# Patient Record
(demographics unavailable — no encounter records)

---

## 2024-11-12 NOTE — HISTORY OF PRESENT ILLNESS
[FreeTextEntry1] : Zora is a 58 year old nonverbal female with PMH of profound intellectual disability, seizure disorder(unknown etiology), bipolar disorder and tomy presented for a follow up. [facility]. She was scheduled to have REEG this morning but they were late so it did  not happen.  Since last visit no reported seizures or hospitalizations. Zora refused to be examined today again so it is unlikely that we will be able to get REEG done. No new blood work done.   Pts last GTC event was October 2020  Pt is on Lamictal 250 mgs BID.  Pt had a VEEG in 2019 which was abnormal due to the presence of focal and generalized slowing   No record of MRI.  Unable to do any tests unless pt is sedated.

## 2024-11-12 NOTE — PHYSICAL EXAM
[FreeTextEntry1] : Physical exam was limited due to intellectual disability. \par  Pt is awake, nonverbal WRIGHT.  Attempts to track with eyes at times. Scoliotic posture noted.

## 2024-11-12 NOTE — HISTORY OF PRESENT ILLNESS
[FreeTextEntry1] : Zora is a 58 year old nonverbal female with PMH of profound intellectual disability, seizure disorder(unknown etiology), bipolar disorder and tomy presented for a follow up. [facility]. She was scheduled to have REEG this morning but they were late so it did  not happen.  Since last visit no reported seizures or hospitalizations. Zora refused to be examined today again so it is unlikely that we will be able to get REEG done. No new blood work done.   Pts last GTC event was October 2020  Pt is on Lamictal 250 mgs BID.  Pt had a VEEG in 2019 which was abnormal due to the presence of focal and generalized slowing   No record of MRI.  Unable to do any tests unless pt is sedated.

## 2024-11-12 NOTE — DISCUSSION/SUMMARY
[FreeTextEntry1] : Zora is a 58 year old nonverbal female has a history of profound intellectual disability, seizure disorder(unknown etiology), bipolar disorder and tomy with good seizure control.    Plan: - will attempt REEG before next visit - follow up in 7-8 months - rx given for blood work to be done this month - maintain seizure diary, call if any issues arise prior to follow up - continue same Lamictal dose  Case discussed with Dr. Leela Hinton, DNP, ACNP-BC

## 2024-11-12 NOTE — HISTORY OF PRESENT ILLNESS
[FreeTextEntry1] : Zora is a 58 year old nonverbal female with PMH of profound intellectual disability, seizure disorder(unknown etiology), bipolar disorder and tomy presented for a follow up. [facility]. She was scheduled to have REEG this morning but they were late so it did  not happen.  Since last visit no reported seizures or hospitalizations. Zora refused to be examined today again so it is unlikely that we will be able to get REEG done. No new blood work done.   Pts last GTC event was October 2020  Pt is on Lamictal 250 mgs BID.  Pt had a VEEG in 2019 which was abnormal due to the presence of focal and generalized slowing   No record of MRI.  Unable to do any tests unless pt is sedated.

## 2024-11-12 NOTE — DISCUSSION/SUMMARY
[FreeTextEntry1] : Zora is a 58 year old nonverbal female has a history of profound intellectual disability, seizure disorder(unknown etiology), bipolar disorder and tmoy with good seizure control.    Plan: - will attempt REEG before next visit - follow up in 7-8 months - rx given for blood work to be done this month - maintain seizure diary, call if any issues arise prior to follow up - continue same Lamictal dose  Case discussed with Dr. Leela Hinton, DNP, ACNP-BC

## 2024-12-12 NOTE — ASSESSMENT
[FreeTextEntry1] : ?seronegative RA: Pt was noted to have significantly elevated CRP to 63 on recent labs, also ESR 35, with negative RF, CCP. Pt's exam is unclear. She does have abnormalities but it is unclear if they could be related to injuries as opposed to inflammatory arthritis. Pt was started on methotrexate in February 2023, no noted change in her behavior since that time. - Discontinue methotrexate - Discontinue folic acid - Start leflunomide 100 milligrams daily for 3 days, then 20 milligrams daily starting day 4 - Continue Tylenol 1000 mg q day prn pain - 11/2024 CBC normal, CMP with stable slightly elevated alk phos - f/u repeat labs in 1 month for side effects of leflunomide  f/u in 3 months

## 2024-12-12 NOTE — HISTORY OF PRESENT ILLNESS
[FreeTextEntry1] : Pt's hand deformities were noted to be progressing by her group home. She has not had x-rays done due to logistical difficulties.  Previous HPI: Pt has a h/o intellectual disability with stereotypic movement disorder and self-injurious behavior. She was initially seen by Dr. Petty once in 2/2023 for swelling and deformities in her hands, and was thought to have RA based on her hand exam. She was started on methotrexate 15 mg q week and never followed up since then. Per pt's caregiver, who has been working with her for approx 1 year, pt is agitated approx once per week, possibly due to pain? She has no LE problems or difficulty walking. No rashes.  Physical exam: Alert woman standing in exam room SKIN: no rashes MSK: Shoulders: Limited exam due to pt cooperation Elbows: + Bony nodule noted on L lateral elbow, limited exam due to pt cooperation Wrists: Full ROM b/l, no effusions Hands: + Bony hypertrophy in b/l MCPs, + ulnar deviation most pronounced in b/l 5th fingers, dorsal muscle wasting, + R 5th finger severe ulnar deviation, overall appear ?worse compared to prior Hips: No noted abnormalities Knees: limited exam, no clear abnormalities Ankles: no effusions, no clear abnormalities Feet: limited exam

## 2024-12-18 NOTE — PHYSICAL EXAM
[No Acute Distress] : no acute distress [Well Nourished] : well nourished [Normal Sclera/Conjunctiva] : normal sclera/conjunctiva [Normal Outer Ear/Nose] : the outer ears and nose were normal in appearance [Supple] : supple [No Respiratory Distress] : no respiratory distress  [No Accessory Muscle Use] : no accessory muscle use [Clear to Auscultation] : lungs were clear to auscultation bilaterally [Normal Rate] : normal rate  [Regular Rhythm] : with a regular rhythm [Normal S1, S2] : normal S1 and S2 [No Murmur] : no murmur heard [No Edema] : there was no peripheral edema [Soft] : abdomen soft [Non Tender] : non-tender [Non-distended] : non-distended [No HSM] : no HSM

## 2024-12-18 NOTE — END OF VISIT
[] : Resident [FreeTextEntry3] : Pt. here for annual physical exam.  Had Tdap 2/29/24, flu vaccine given today.  Blood work 11/6/24 done by Dr. Rasmussen, , 25-OH Vit. D 74, A1C 5.1 (9/6/24), TSH 1.92 (9/30/24), LDL 11, triglyceride 228 (9/30/24).  Blood work and EKG ordered.  Mammogram attempted, and pt. was uncooperative with exam; and seen Gyn 3/19/24, pt. uncooperative with exam.  Had colonoscopy 12/19/22 by Dr. Heck, repeat 5 years (12/2027).  Seen rheumatology 12/12/24, off methotrexate and folic acid; on leflunomide.  Will d/c Vitamin D [id number] weekly given 25-OH Vit. D 74.  Medication renewed.  Ophthal. referral for routine eye exam.  Follow Gyn 3/2025 for annual Gyn exam, rheumatology 3/18/25 for seronegative RA, neuro 6/4/25 for seizure.  RTC 3 months.

## 2024-12-18 NOTE — HISTORY OF PRESENT ILLNESS
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 Patient is non verbal. As per staff no new complaints, patient here for annual physical exam

## 2024-12-18 NOTE — ASSESSMENT
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   #Intellectual Disability (profound) - patient is nonverbal at baseline,  - no lab work as patient noncompliant so gets it done at the facility, reviewed on HIE (from sept and nov)  #h/o multiple colonic polyps s/p polypectomy #Cancer Colon Screening - 5mm polyp in transverse colon s/p polypectomy - 10mm polyp in cecum s/p polypectomy - Repeat colonoscopy done Dec 2022: hemorrhoids, diverticulosis, repeat in 5 years  #Rheumatoid Arthritis  - endorsed to aid at bedside, to obtain 3-6 monthly LFTs, CXR, monitor for side effects like oral ulcers and immunosuppression and follow up with rheum - Seen by Rheum in December 2024: Discontinue methotrexate and folic acid, Start leflunomide 100 milligrams daily for 3 days, then 20 milligrams daily starting day 4, c/w tylenol, f/u with labs and xrays, f/u in 3 months   #HLD - labs from nov 2024 - Lipid panel: Trig 228, , HDL 32, T chol 189 - currently on Atorvastatin 10mg daily - endorsed on low-calorie, low-fat diet, no soda/smoking./alcohol, and aerobic exercise as tolerated - Repeat lipid profile for next visit   #Bipolar II Disorder #Stereotypic movement disorder #Insomnia - Better on melatonin - c/w Lamotrigine 250mg BID  #h/o Seizure Disorder - last episode was in 2019 - c/w Lamotrigine 250mg BID - followed with neuro in nov 2024:  unable to complete REEG, will get it before next visit   #Allergic Rhinitis - takes Singular 10mg at bedtime  #h/o Choking-->resolved - currently on soft/bite sized diet (food is usually grounded up to prevent choking) - no recent episodes of choking  #Constipation - On senna and lactulose - Well controlled  #Vit D deficiency - Vit D 74 (sept 2024)  - no more supplements needed   #HCM - HnG3z=0.1, ophtho referral  - EKG ordered, unable to obtain due to noncompliance  - flu shot dec 2024 - Repeat colonoscopy done Dec 2022: hemorrhoids, diverticulosis, repeat in 5 years - OBGYN seen Mar 2024, breast exam done but was not able to do pap smear - Mammogram/Breast US not done- patient non cooperative - neuro f/u 4 June 2025 - rheum f/u 14 march 2025 - medications renewed - RTC in 3 months

## 2025-03-18 NOTE — HISTORY OF PRESENT ILLNESS
[FreeTextEntry1] : Pt's symptoms have reportedly been stable since her last visit. Per her caregiver, she intermittently expresses signs of discomfort by gesturing, and she receives prn Tylenol.  Previous HPI: Pt has a h/o intellectual disability with stereotypic movement disorder and self-injurious behavior. She was initially seen by Dr. Petty once in 2/2023 for swelling and deformities in her hands, and was thought to have RA based on her hand exam. She was started on methotrexate 15 mg q week and never followed up since then. Per pt's caregiver, who has been working with her for approx 1 year, pt is agitated approx once per week, possibly due to pain? She has no LE problems or difficulty walking. No rashes.  Physical exam: Alert woman standing in exam room SKIN: no rashes MSK: Shoulders: Limited exam due to pt cooperation Elbows: + Bony nodule noted on L lateral elbow, limited exam due to pt cooperation Wrists: Full ROM b/l, no effusions Hands: + Bony hypertrophy in b/l MCPs, + ulnar deviation most pronounced in b/l 5th fingers, dorsal muscle wasting, + R 5th finger severe ulnar deviation Hips: No noted abnormalities Knees: limited exam, no clear abnormalities Ankles: no effusions, no clear abnormalities Feet: limited exam

## 2025-03-18 NOTE — ASSESSMENT
[FreeTextEntry1] : ?seronegative RA: Pt was noted to have significantly elevated CRP to 63 on recent labs, also ESR 35, with negative RF, CCP. Pt's exam is unclear. She does have abnormalities but it is unclear if they could be related to injuries as opposed to inflammatory arthritis. Pt was started on methotrexate in February 2023, no noted change in her behavior since that time. - Okay to give Tylenol 325 mg every 6 hours as needed for pain - Continue leflunomide 20 mg q day - Pt had normal CBC and CMP with stable slightly elevated alk phos in 3/2025  f/u in 4 months, f/u labs prior to next visit

## 2025-03-19 NOTE — HISTORY OF PRESENT ILLNESS
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 As per staff no new complaints. Adviced to book appointment for Gyn.

## 2025-03-19 NOTE — ASSESSMENT
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), Insomnia (related to mood disorder), HLD, h/o colonic polyps, allergic rhinitis, constipation and a h/o choking presents to the clinic w/ staff Geovani   #Intellectual Disability (profound) - patient is nonverbal at baseline, - no lab work as patient noncompliant so gets it done at the facility, reviewed on HIE (from sept and nov)  #h/o multiple colonic polyps s/p polypectomy #Cancer Colon Screening - 5mm polyp in transverse colon s/p polypectomy - 10mm polyp in cecum s/p polypectomy - Repeat colonoscopy done Dec 2022: hemorrhoids, diverticulosis, repeat in 5 years  #Rheumatoid Arthritis - high esr/crp, negative ccp, RA  - Seen by Rheum 2025: leflunomide 20 qdaily, tylenol 325gm q6hr   #HLD - labs from nov 2024 - Lipid panel: Trig 228, , HDL 32, T chol 189 - currently on Atorvastatin 10mg daily - endorsed on low-calorie, low-fat diet, no soda/smoking./alcohol, and aerobic exercise as tolerated - Repeat lipid profile for next visit  #Bipolar II Disorder #Stereotypic movement disorder #Insomnia - Better on melatonin - c/w Lamotrigine 250mg BID  #h/o Seizure Disorder - last episode was in 2019 - c/w Lamotrigine 250mg BID - followed with neuro   #Allergic Rhinitis - takes Singular 10mg at bedtime  #h/o Choking-->resolved - currently on soft/bite sized diet (food is usually grounded up to prevent choking) - no recent episodes of choking  #Constipation - On senna and lactulose - Well controlled  #Vit D deficiency - Vit D 74 (sept 2024) - no more supplements needed  #HCM - HfW9d=9.1, ophtho f/u in may  - flu shot dec 2024 - Repeat colonoscopy done Dec 2022: hemorrhoids, diverticulosis, repeat in 2027 years - OBGYN seen Mar 2024, breast exam done but was not able to do pap smear - Mammogram/Breast US not done- patient non cooperative - neuro f/u 4 June 2025 - medications renewed - RTC in 3 months.

## 2025-03-19 NOTE — END OF VISIT
[] : Resident [FreeTextEntry3] : Pt. here for follow up.  Had Tdap 2/29/24, flu vaccine 12/18/24.  Blood work 3/11/25 by Dr. Rasmussen A1C 4.7, TSH 1.32, FT4 1.2, LDL 85, triglyceride 429, 25-OH Vit. D 48.  Mammogram attempted, and pt. was uncooperative with exam; and seen Gyn 3/19/24, pt. uncooperative with exam.  Had colonoscopy 12/19/22 by Dr. Heck, repeat 5 years (12/2027).  Medication renewed.  Advised staff to schedule Gyn appointment for annual Gyn exam.  Follow ophthal. 5/1/25, neuro 6/4/25 for seizure, rheumatology 7/16/25 for seronegative RA.  RTC 3 months

## 2025-03-19 NOTE — PHYSICAL EXAM
[No Acute Distress] : no acute distress [Well Nourished] : well nourished [Normal Sclera/Conjunctiva] : normal sclera/conjunctiva [PERRL] : pupils equal round and reactive to light [EOMI] : extraocular movements intact [Normal Outer Ear/Nose] : the outer ears and nose were normal in appearance [Normal Oropharynx] : the oropharynx was normal [No JVD] : no jugular venous distention [No Lymphadenopathy] : no lymphadenopathy [Supple] : supple [Thyroid Normal, No Nodules] : the thyroid was normal and there were no nodules present [No Respiratory Distress] : no respiratory distress  [No Accessory Muscle Use] : no accessory muscle use [Clear to Auscultation] : lungs were clear to auscultation bilaterally [Normal Rate] : normal rate  [Regular Rhythm] : with a regular rhythm [Normal S1, S2] : normal S1 and S2 [No Murmur] : no murmur heard [No Carotid Bruits] : no carotid bruits [No Abdominal Bruit] : a ~M bruit was not heard ~T in the abdomen [No Varicosities] : no varicosities [Pedal Pulses Present] : the pedal pulses are present [No Edema] : there was no peripheral edema [No Palpable Aorta] : no palpable aorta [No Extremity Clubbing/Cyanosis] : no extremity clubbing/cyanosis [Soft] : abdomen soft [Non Tender] : non-tender [Non-distended] : non-distended [No Masses] : no abdominal mass palpated [No HSM] : no HSM [Normal Bowel Sounds] : normal bowel sounds [Normal Posterior Cervical Nodes] : no posterior cervical lymphadenopathy [Normal Anterior Cervical Nodes] : no anterior cervical lymphadenopathy [No CVA Tenderness] : no CVA  tenderness [No Spinal Tenderness] : no spinal tenderness [No Joint Swelling] : no joint swelling [No Rash] : no rash

## 2025-06-10 NOTE — HISTORY OF PRESENT ILLNESS
[FreeTextEntry1] : Zora is a 59 year old nonverbal female with PMH of profound intellectual disability, seizure disorder(unknown etiology), bipolar disorder and tomy presented for a follow up. [facility].  We attempted to do REEG this morning again and were unsuccessful.  Since last visit no reported seizures or hospitalizations. Zora refused to be examined today again today. Does not want to be touched and pushed me away. Blood work from 3/11/25 Mag 2.0, Vit D 48, Lamictal  6 Pt is on Lamictal 250 mgs BID. Pts last GTC event was October 2020   Pt had a VEEG in 2019 which was abnormal due to the presence of focal and generalized slowing   No record of MRI.  Unable to do any tests unless pt is sedated.

## 2025-06-10 NOTE — HISTORY OF PRESENT ILLNESS
[FreeTextEntry1] : Zora is a 59 year old nonverbal female with PMH of profound intellectual disability, seizure disorder(unknown etiology), bipolar disorder and tomy presented for a follow up. [facility]  We attempted to do REEG this morning again and were unsuccessful.  Since last visit no reported seizures or hospitalizations. Zora refused to be examined today again today. Does not want to be touched and pushed me away. Blood work from 3/11/25 Mag 2.0, Vit D 48, Lamictal  6 Pt is on Lamictal 250 mgs BID. Pts last GTC event was October 2020   Pt had a VEEG in 2019 which was abnormal due to the presence of focal and generalized slowing   No record of MRI.  Unable to do any tests unless pt is sedated.

## 2025-06-10 NOTE — DISCUSSION/SUMMARY
[Safety Recommendations] : The patient was advised in regards to the risk of seizures and general seizure safety recommendations including not to be bathing alone, climbing to high places and operating heavy machinery. [Compliance with Medications] : The importance of compliance with medications was reinforced. [Medication Side Effects] : High frequency and serious potential medication adverse effects were reviewed with the patient, including but not exclusive to psychiatric effects.  Information sheets on medication side effects were made available to the patient in our clinic.  The patient or advocate agrees to notify us for any concerns. [Sleep Hygiene/Sleep Disruption Risks] : Sleep hygiene and the risks of sleep disruption were discussed. [FreeTextEntry1] : Zora is a 59 year old nonverbal female has a history of profound intellectual disability, seizure disorder(unknown etiology), bipolar disorder and tomy with good seizure control.    Plan: - follow up in 7-8 months - rx given for blood work to be done before next follow up - maintain seizure diary, call if any issues arise prior to follow up - continue same Lamictal dose  Case discussed with Dr. Leela Hinton, DNP, ACNP-BC

## 2025-07-16 NOTE — ASSESSMENT
[FreeTextEntry1] : ?seronegative RA: Pt was noted to have significantly elevated CRP to 63 on recent labs, also ESR 35, with negative RF, CCP. Pt's exam is unclear. She does have abnormalities but it is unclear if they could be related to injuries as opposed to inflammatory arthritis. Pt was started on methotrexate in February 2023, no noted change in her behavior since that time. - Continue Tylenol 325 mg every 6 hours as needed for pain - Continue leflunomide 20 mg q day - Pt had normal CBC and CMP with stable slightly elevated alk phos in 7/2025  f/u in 6 months, f/u labs prior to next visit

## 2025-07-16 NOTE — HISTORY OF PRESENT ILLNESS
[FreeTextEntry1] : Per caregiver pt has had no change in her behavior since her last visit. Unknown if she is receiving Tylenol, no medication log available.  Previous HPI: Pt has a h/o intellectual disability with stereotypic movement disorder and self-injurious behavior. She was initially seen by Dr. Petty once in 2/2023 for swelling and deformities in her hands, and was thought to have RA based on her hand exam. She was started on methotrexate 15 mg q week and never followed up since then. Per pt's caregiver, who has been working with her for approx 1 year, pt is agitated approx once per week, possibly due to pain? She has no LE problems or difficulty walking. No rashes.  Physical exam: Alert woman standing in exam room SKIN: no rashes MSK: Shoulders: Limited exam due to pt cooperation Elbows: + Bony nodule noted on L lateral elbow with ?decreased ROM, limited exam due to pt cooperation Wrists: Full ROM b/l, no effusions Hands: + Bony hypertrophy in b/l MCPs, + ulnar deviation most pronounced in b/l 5th fingers, dorsal muscle wasting, + R 5th finger severe ulnar deviation Hips: No noted abnormalities Knees: limited exam, no clear abnormalities Ankles: no effusions, no clear abnormalities Feet: limited exam

## 2025-07-30 NOTE — ASSESSMENT
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  - Apply moisturizer on affected part.  - Left leg varicose seen > No pain on leg ; walking normal. Will monitor.   #Rheumatoid Arthritis - high esr/crp, negative ccp, RA  - Seen by Rheum 2025: leflunomide 20 qdaily, tylenol 325gm q6hr ; Follow up in 6 months ; January 2026.  - Stable symptoms wise.   #HLD - labs from nov 2024 - Lipid panel: Trig 228, , HDL 32, T chol 189 - currently on Atorvastatin 10mg daily - endorsed on low-calorie, low-fat diet, no soda/smoking./alcohol, and aerobic exercise as tolerated - Repeat lipid profile  #Bipolar II Disorder #Stereotypic movement disorder #Insomnia - Better on melatonin - c/w Lamotrigine 250mg BID  #h/o Seizure Disorder - last episode was in 2019 - c/w Lamotrigine 250mg BID - followed with neuro ; Next Appointment January 2026  #Allergic Rhinitis - takes Singular 10mg at bedtime>> Continue   #h/o Choking-->resolved - currently on soft/bite sized diet (food is usually grounded up to prevent choking) - no recent episodes of choking  #Constipation - On senna and lactulose - Well controlled  #Vit D deficiency - Vit D 74 (sept 2024) - no more supplements needed  #h/o multiple colonic polyps s/p polypectomy #Cancer Colon Screening - 5mm polyp in transverse colon s/p polypectomy - 10mm polyp in cecum s/p polypectomy - Repeat colonoscopy done Dec 2022: hemorrhoids, diverticulosis, repeat in 5 years  #HCM - RkD2m=5.0, ophtho f/u in may ; Ophthalmology follow up.  - flu shot dec 2024 - Repeat colonoscopy done Dec 2022: hemorrhoids, diverticulosis, repeat in 2027 years - OBGYN seen Mar 2024, breast exam done but was not able to do pap smear - Mammogram/Breast US not done- patient non cooperative: follow up GYN appointment Nov 18, 2025 . Needs mammography as part of screening. - medications renewed - opth follow up in 4 months ; appointment there  - RTC in 3 months.

## 2025-07-30 NOTE — HISTORY OF PRESENT ILLNESS
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 Patient is nonverbal. As per staff no new complaints.  Here for regular follow up

## 2025-07-30 NOTE — END OF VISIT
[] : Resident [FreeTextEntry3] : Pt. here for follow up.  Had Tdap 2/29/24, flu vaccine 12/18/24.  Blood work 4/11/24 TASHI (-), CCP (-), RF 12, 25-OH Vit. D 27, CRP 63.3, Mg 2.3, ESR 35, folate 18.3.  Mammogram attempted, and pt. was uncooperative with exam; and seen Gyn 3/19/24, pt. uncooperative with exam.  Had colonoscopy 12/19/22 by Dr. Heck, repeat 5 years (12/2027).  Seen ophthal. 5/8/25.  Medication renewed.  Blood work ordered.  Follow ophthal. 8/21/25, Gyn 11/18/25 for annual Gyn exam, neuro 1/5/26 for seizure, rheumatology 1/12/26 for ??seronegative RA.  RTC 3 months.

## 2025-07-30 NOTE — REVIEW OF SYSTEMS
[Negative] : Heme/Lymph [Cough] : cough [FreeTextEntry2] : non verbal at baseline, history taken from aide [de-identified] : Rash on bilateral lower legs; scratch marks